# Patient Record
Sex: MALE | Race: OTHER | URBAN - METROPOLITAN AREA
[De-identification: names, ages, dates, MRNs, and addresses within clinical notes are randomized per-mention and may not be internally consistent; named-entity substitution may affect disease eponyms.]

---

## 2017-11-21 PROBLEM — Z00.00 ENCOUNTER FOR PREVENTIVE HEALTH EXAMINATION: Status: ACTIVE | Noted: 2017-11-21

## 2018-07-03 VITALS
RESPIRATION RATE: 16 BRPM | SYSTOLIC BLOOD PRESSURE: 142 MMHG | HEART RATE: 52 BPM | DIASTOLIC BLOOD PRESSURE: 70 MMHG | TEMPERATURE: 97 F

## 2018-07-03 NOTE — H&P ADULT - PMH
BPH (benign prostatic hypertrophy)    Coronary artery disease    Hyperlipidemia    Hypertension    Hypothyroidism BPH (benign prostatic hypertrophy)    Coronary artery disease    Hyperlipidemia    Hypertension    Hypothyroidism    Prediabetes

## 2018-07-03 NOTE — H&P ADULT - ASSESSMENT
67 y.o Male POOR HISTORIAN with PMHx of HTN, Hyperlipidemia, Hypothyroidism, Known CAD with prior PCI @ [facility] in 2007 (report archived), who presents for recommended LHC with possible intervention if clinically indicated secondary to CCS Anginal Class 3 Symptoms in the setting of an abnormal Stress Echo.      ASA: III and Mallampati: IIII     -Precath/Consented  -IVF Hydration NS @ 75cc/hr   -Loaded with ASA 325mg PO X 1 and Plavix 600mg PO X 1 67 y.o Male POOR HISTORIAN with PMHx of HTN, Hyperlipidemia, Hypothyroidism, Known CAD with prior PCI @ [facility] in 2007 (report archived), who presents for recommended LHC with possible intervention if clinically indicated secondary to CCS Anginal Class 3 Symptoms in the setting of an abnormal Stress Echo.      ASA: III and Mallampati: IIII     -Precath/Consented  -IVF Hydration NS @ 75cc/hr   -Loaded with ASA 325mg PO X 1 and Plavix 600mg PO X 1 67 y.o Male POOR HISTORIAN with PMHx of HTN, Hyperlipidemia, Hypothyroidism, Known CAD with prior PCI @ [facility] in 2007 (report archived), who presents for recommended LHC with possible intervention if clinically indicated secondary to CCS Anginal Class 3 Symptoms in the setting of an abnormal Stress Echo.      ASA: III and Mallampati: IIII     -Precath/Consented  -Pt found be bradycardic in the 40's upon arrival, asymptomatic currently.  But has been symptomatic  @ home with feeling lightheaded/dizziness ever since Metoprolol Tartrate 25mg PO BID was started recently as an outpt.  EKG today demonstrated Sinus Hermann @  47 bpm with 1st degree AVB. As d/w Dr. Barrera will d/c BB  for now unless pt undergoes PCI today consider lowering  BB dose.    -IVF Hydration NS @ 75cc/hr   -Loaded with ASA 325mg PO X 1 and Plavix 600mg PO X 1

## 2018-07-03 NOTE — H&P ADULT - HISTORY OF PRESENT ILLNESS
***PT TO BRING IN ALL MEDS    67 y.o Male POOR HISTORIAN with PMHx of HTN, Hyperlipidemia, Hypothyroidism, Known CAD with prior PCI @ [facility] in 2007 (report pending ), who presented t his cardiologist Dr. Barrera c/o new onset exertional CP and  dyspnea over the past few months.  Pt states he has been experiencing left sided Chest "pressure" 7/10 in severity, radiating to the left arm and jaw  with accompanying dyspnea on exertion when walking 1-2 blocks, resolving within minutes of rest.  Denies palpitations, dizziness, syncope, recent PND/orthopnea, or LE edema.  Pt was subsequently referred for Stress Echo on 06/02/18 which revealed inferior wall hypokinesis, trace MR/TR, LVEF of 55%.    Occasional PVCs and a rare couplet noted were during the study.        In light of pt's risk factors, Known CAD, above CCS Anginal Class 3 symptoms, and an abnormal Stress Echo, pt is now referred to [facility] for recommended Cardiac Cath with possible intervention if clinically indicated to  r/o progressive CAD. ***PT TO BRING IN ALL MEDS    67 y.o Male POOR HISTORIAN with PMHx of HTN, Hyperlipidemia, Hypothyroidism, Known CAD with prior PCI @ [facility] in 2007 (report archived), who presented t his cardiologist Dr. Barrera c/o new onset exertional CP and  dyspnea over the past few months.  Pt states he has been experiencing left sided Chest "pressure" 7/10 in severity, radiating to the left arm and jaw  with accompanying dyspnea on exertion when walking 1-2 blocks, resolving within minutes of rest.  Denies palpitations, dizziness, syncope, recent PND/orthopnea, or LE edema.  Pt was subsequently referred for Stress Echo on 06/02/18 which revealed inferior wall hypokinesis, trace MR/TR, LVEF of 55%.    Occasional PVCs and a rare couplet noted were during the study.        In light of pt's risk factors, Known CAD, above CCS Anginal Class 3 symptoms, and an abnormal Stress Echo, pt is now referred to [facility] for recommended Cardiac Cath with possible intervention if clinically indicated to  r/o progressive CAD. 67 y.o Male POOR HISTORIAN with PMHx of HTN, Hyperlipidemia, Hypothyroidism, Known CAD with prior PCI @ [facility] in 2007 (report archived), who presented t his cardiologist Dr. Barrera c/o new onset exertional CP and  dyspnea over the past few months.  Pt states he has been experiencing left sided Chest "pressure" 7/10 in severity, radiating to the left arm and jaw  with accompanying dyspnea on exertion when walking 1-2 blocks, resolving within minutes of rest.  Denies palpitations, dizziness, syncope, recent PND/orthopnea, or LE edema.  Pt was subsequently referred for Stress Echo on 06/02/18 which revealed inferior wall hypokinesis, trace MR/TR, LVEF of 55%.    Occasional PVCs and a rare couplet noted were during the study.        In light of pt's risk factors, Known CAD, above CCS Anginal Class 3 symptoms, and an abnormal Stress Echo, pt is now referred to [facility] for recommended Cardiac Cath with possible intervention if clinically indicated to  r/o progressive CAD. 67 y.o Male POOR HISTORIAN with PMHx of HTN, Hyperlipidemia, prediabetes, Hypothyroidism, Known CAD with prior PCI @ [facility] in 2007 (report archived), who presented t his cardiologist Dr. Barrera c/o new onset exertional CP and  dyspnea over the past few months.  Pt states he has been experiencing left sided Chest "pressure" 7/10 in severity, radiating to the left arm and jaw  with accompanying dyspnea on exertion when walking 1-2 blocks, resolving within minutes of rest.  Denies palpitations, dizziness, syncope, recent PND/orthopnea, or LE edema.  Pt was subsequently referred for Stress Echo on 06/02/18 which revealed inferior wall hypokinesis, trace MR/TR, LVEF of 55%.    Occasional PVCs and a rare couplet noted were during the study.        In light of pt's risk factors, Known CAD, above CCS Anginal Class 3 symptoms, and an abnormal Stress Echo, pt is now referred to [facility] for recommended Cardiac Cath with possible intervention if clinically indicated to  r/o progressive CAD.

## 2018-07-06 ENCOUNTER — INPATIENT (INPATIENT)
Facility: HOSPITAL | Age: 68
LOS: 0 days | Discharge: HOME | DRG: 247 | End: 2018-07-07
Attending: INTERNAL MEDICINE | Admitting: INTERNAL MEDICINE
Payer: MEDICARE

## 2018-07-06 DIAGNOSIS — Z90.79 ACQUIRED ABSENCE OF OTHER GENITAL ORGAN(S): Chronic | ICD-10-CM

## 2018-07-06 DIAGNOSIS — Z98.890 OTHER SPECIFIED POSTPROCEDURAL STATES: Chronic | ICD-10-CM

## 2018-07-06 LAB
ALBUMIN SERPL ELPH-MCNC: 4.9 G/DL — SIGNIFICANT CHANGE UP (ref 3.3–5)
ALP SERPL-CCNC: 85 U/L — SIGNIFICANT CHANGE UP (ref 40–120)
ALT FLD-CCNC: 20 U/L — SIGNIFICANT CHANGE UP (ref 10–45)
ANION GAP SERPL CALC-SCNC: 16 MMOL/L — SIGNIFICANT CHANGE UP (ref 5–17)
APTT BLD: 32.2 SEC — SIGNIFICANT CHANGE UP (ref 27.5–37.4)
AST SERPL-CCNC: 23 U/L — SIGNIFICANT CHANGE UP (ref 10–40)
BASOPHILS NFR BLD AUTO: 0.6 % — SIGNIFICANT CHANGE UP (ref 0–2)
BILIRUB SERPL-MCNC: 0.8 MG/DL — SIGNIFICANT CHANGE UP (ref 0.2–1.2)
BUN SERPL-MCNC: 29 MG/DL — HIGH (ref 7–23)
CALCIUM SERPL-MCNC: 9.5 MG/DL — SIGNIFICANT CHANGE UP (ref 8.4–10.5)
CHLORIDE SERPL-SCNC: 102 MMOL/L — SIGNIFICANT CHANGE UP (ref 96–108)
CHOLEST SERPL-MCNC: 178 MG/DL — SIGNIFICANT CHANGE UP (ref 10–199)
CK MB CFR SERPL CALC: 2.8 NG/ML — SIGNIFICANT CHANGE UP (ref 0–6.7)
CK SERPL-CCNC: 189 U/L — SIGNIFICANT CHANGE UP (ref 30–200)
CO2 SERPL-SCNC: 23 MMOL/L — SIGNIFICANT CHANGE UP (ref 22–31)
CREAT SERPL-MCNC: 1.16 MG/DL — SIGNIFICANT CHANGE UP (ref 0.5–1.3)
CRP SERPL-MCNC: 0.1 MG/DL — SIGNIFICANT CHANGE UP (ref 0–0.4)
EOSINOPHIL NFR BLD AUTO: 5 % — SIGNIFICANT CHANGE UP (ref 0–6)
GLUCOSE SERPL-MCNC: 103 MG/DL — HIGH (ref 70–99)
HCT VFR BLD CALC: 38.6 % — LOW (ref 39–50)
HDLC SERPL-MCNC: 64 MG/DL — SIGNIFICANT CHANGE UP (ref 40–125)
HGB BLD-MCNC: 12.8 G/DL — LOW (ref 13–17)
INR BLD: 0.96 — SIGNIFICANT CHANGE UP (ref 0.88–1.16)
LIPID PNL WITH DIRECT LDL SERPL: 96 MG/DL — SIGNIFICANT CHANGE UP
LYMPHOCYTES # BLD AUTO: 32.8 % — SIGNIFICANT CHANGE UP (ref 13–44)
MCHC RBC-ENTMCNC: 28.8 PG — SIGNIFICANT CHANGE UP (ref 27–34)
MCHC RBC-ENTMCNC: 33.2 G/DL — SIGNIFICANT CHANGE UP (ref 32–36)
MCV RBC AUTO: 86.7 FL — SIGNIFICANT CHANGE UP (ref 80–100)
MONOCYTES NFR BLD AUTO: 7.4 % — SIGNIFICANT CHANGE UP (ref 2–14)
NEUTROPHILS NFR BLD AUTO: 54.2 % — SIGNIFICANT CHANGE UP (ref 43–77)
PLATELET # BLD AUTO: 258 K/UL — SIGNIFICANT CHANGE UP (ref 150–400)
POTASSIUM SERPL-MCNC: 4.4 MMOL/L — SIGNIFICANT CHANGE UP (ref 3.5–5.3)
POTASSIUM SERPL-SCNC: 4.4 MMOL/L — SIGNIFICANT CHANGE UP (ref 3.5–5.3)
PROT SERPL-MCNC: 8.4 G/DL — HIGH (ref 6–8.3)
PROTHROM AB SERPL-ACNC: 10.7 SEC — SIGNIFICANT CHANGE UP (ref 9.8–12.7)
RBC # BLD: 4.45 M/UL — SIGNIFICANT CHANGE UP (ref 4.2–5.8)
RBC # FLD: 14.6 % — SIGNIFICANT CHANGE UP (ref 10.3–16.9)
SODIUM SERPL-SCNC: 141 MMOL/L — SIGNIFICANT CHANGE UP (ref 135–145)
TOTAL CHOLESTEROL/HDL RATIO MEASUREMENT: 2.8 RATIO — LOW (ref 3.4–9.6)
TRIGL SERPL-MCNC: 89 MG/DL — SIGNIFICANT CHANGE UP (ref 10–149)
WBC # BLD: 9 K/UL — SIGNIFICANT CHANGE UP (ref 3.8–10.5)
WBC # FLD AUTO: 9 K/UL — SIGNIFICANT CHANGE UP (ref 3.8–10.5)

## 2018-07-06 PROCEDURE — 92933 PRQ TRLML C ATHRC ST ANGIOP1: CPT | Mod: RC

## 2018-07-06 PROCEDURE — 93458 L HRT ARTERY/VENTRICLE ANGIO: CPT | Mod: 26,XU

## 2018-07-06 PROCEDURE — 92978 ENDOLUMINL IVUS OCT C 1ST: CPT | Mod: 26,RC

## 2018-07-06 RX ORDER — AMLODIPINE BESYLATE 2.5 MG/1
10 TABLET ORAL DAILY
Qty: 0 | Refills: 0 | Status: DISCONTINUED | OUTPATIENT
Start: 2018-07-06 | End: 2018-07-07

## 2018-07-06 RX ORDER — SODIUM CHLORIDE 9 MG/ML
500 INJECTION INTRAMUSCULAR; INTRAVENOUS; SUBCUTANEOUS
Qty: 0 | Refills: 0 | Status: DISCONTINUED | OUTPATIENT
Start: 2018-07-06 | End: 2018-07-06

## 2018-07-06 RX ORDER — FUROSEMIDE 40 MG
1 TABLET ORAL
Qty: 0 | Refills: 0 | COMMUNITY

## 2018-07-06 RX ORDER — CLOPIDOGREL BISULFATE 75 MG/1
75 TABLET, FILM COATED ORAL DAILY
Qty: 0 | Refills: 0 | Status: DISCONTINUED | OUTPATIENT
Start: 2018-07-07 | End: 2018-07-07

## 2018-07-06 RX ORDER — FUROSEMIDE 40 MG
20 TABLET ORAL DAILY
Qty: 0 | Refills: 0 | Status: DISCONTINUED | OUTPATIENT
Start: 2018-07-06 | End: 2018-07-07

## 2018-07-06 RX ORDER — CLOPIDOGREL BISULFATE 75 MG/1
600 TABLET, FILM COATED ORAL ONCE
Qty: 0 | Refills: 0 | Status: COMPLETED | OUTPATIENT
Start: 2018-07-06 | End: 2018-07-06

## 2018-07-06 RX ORDER — SODIUM CHLORIDE 9 MG/ML
500 INJECTION INTRAMUSCULAR; INTRAVENOUS; SUBCUTANEOUS
Qty: 0 | Refills: 0 | Status: DISCONTINUED | OUTPATIENT
Start: 2018-07-06 | End: 2018-07-07

## 2018-07-06 RX ORDER — ASPIRIN/CALCIUM CARB/MAGNESIUM 324 MG
325 TABLET ORAL ONCE
Qty: 0 | Refills: 0 | Status: COMPLETED | OUTPATIENT
Start: 2018-07-06 | End: 2018-07-06

## 2018-07-06 RX ORDER — AMLODIPINE BESYLATE 2.5 MG/1
1 TABLET ORAL
Qty: 0 | Refills: 0 | COMMUNITY

## 2018-07-06 RX ORDER — ATORVASTATIN CALCIUM 80 MG/1
1 TABLET, FILM COATED ORAL
Qty: 0 | Refills: 0 | COMMUNITY

## 2018-07-06 RX ORDER — LEVOTHYROXINE SODIUM 125 MCG
112 TABLET ORAL DAILY
Qty: 0 | Refills: 0 | Status: DISCONTINUED | OUTPATIENT
Start: 2018-07-06 | End: 2018-07-07

## 2018-07-06 RX ORDER — CHLORHEXIDINE GLUCONATE 213 G/1000ML
1 SOLUTION TOPICAL ONCE
Qty: 0 | Refills: 0 | Status: COMPLETED | OUTPATIENT
Start: 2018-07-06 | End: 2018-07-06

## 2018-07-06 RX ORDER — ATORVASTATIN CALCIUM 80 MG/1
20 TABLET, FILM COATED ORAL AT BEDTIME
Qty: 0 | Refills: 0 | Status: DISCONTINUED | OUTPATIENT
Start: 2018-07-06 | End: 2018-07-07

## 2018-07-06 RX ORDER — ASPIRIN/CALCIUM CARB/MAGNESIUM 324 MG
81 TABLET ORAL DAILY
Qty: 0 | Refills: 0 | Status: DISCONTINUED | OUTPATIENT
Start: 2018-07-07 | End: 2018-07-07

## 2018-07-06 RX ORDER — METOPROLOL TARTRATE 50 MG
25 TABLET ORAL
Qty: 0 | Refills: 0 | Status: DISCONTINUED | OUTPATIENT
Start: 2018-07-06 | End: 2018-07-07

## 2018-07-06 RX ORDER — LEVOTHYROXINE SODIUM 125 MCG
1 TABLET ORAL
Qty: 0 | Refills: 0 | COMMUNITY

## 2018-07-06 RX ADMIN — ATORVASTATIN CALCIUM 20 MILLIGRAM(S): 80 TABLET, FILM COATED ORAL at 21:39

## 2018-07-06 RX ADMIN — CLOPIDOGREL BISULFATE 600 MILLIGRAM(S): 75 TABLET, FILM COATED ORAL at 15:17

## 2018-07-06 RX ADMIN — Medication 325 MILLIGRAM(S): at 15:17

## 2018-07-06 RX ADMIN — SODIUM CHLORIDE 75 MILLILITER(S): 9 INJECTION INTRAMUSCULAR; INTRAVENOUS; SUBCUTANEOUS at 15:18

## 2018-07-06 RX ADMIN — SODIUM CHLORIDE 75 MILLILITER(S): 9 INJECTION INTRAMUSCULAR; INTRAVENOUS; SUBCUTANEOUS at 18:22

## 2018-07-06 NOTE — PATIENT PROFILE ADULT. - TEACHING/LEARNING LEARNING PREFERENCES
skill demonstration/pictorial/written material/audio/computer/internet/individual instruction/video/group instruction/verbal instruction

## 2018-07-07 ENCOUNTER — TRANSCRIPTION ENCOUNTER (OUTPATIENT)
Age: 68
End: 2018-07-07

## 2018-07-07 VITALS — TEMPERATURE: 97 F

## 2018-07-07 LAB
ALBUMIN SERPL ELPH-MCNC: 4 G/DL — SIGNIFICANT CHANGE UP (ref 3.3–5)
ALP SERPL-CCNC: 71 U/L — SIGNIFICANT CHANGE UP (ref 40–120)
ALT FLD-CCNC: 15 U/L — SIGNIFICANT CHANGE UP (ref 10–45)
ANION GAP SERPL CALC-SCNC: 14 MMOL/L — SIGNIFICANT CHANGE UP (ref 5–17)
AST SERPL-CCNC: 20 U/L — SIGNIFICANT CHANGE UP (ref 10–40)
BILIRUB SERPL-MCNC: 0.7 MG/DL — SIGNIFICANT CHANGE UP (ref 0.2–1.2)
BUN SERPL-MCNC: 24 MG/DL — HIGH (ref 7–23)
CALCIUM SERPL-MCNC: 8.6 MG/DL — SIGNIFICANT CHANGE UP (ref 8.4–10.5)
CHLORIDE SERPL-SCNC: 105 MMOL/L — SIGNIFICANT CHANGE UP (ref 96–108)
CO2 SERPL-SCNC: 21 MMOL/L — LOW (ref 22–31)
CREAT SERPL-MCNC: 1.11 MG/DL — SIGNIFICANT CHANGE UP (ref 0.5–1.3)
GLUCOSE SERPL-MCNC: 93 MG/DL — SIGNIFICANT CHANGE UP (ref 70–99)
HCT VFR BLD CALC: 35.1 % — LOW (ref 39–50)
HGB BLD-MCNC: 11.8 G/DL — LOW (ref 13–17)
MAGNESIUM SERPL-MCNC: 2 MG/DL — SIGNIFICANT CHANGE UP (ref 1.6–2.6)
MCHC RBC-ENTMCNC: 29.4 PG — SIGNIFICANT CHANGE UP (ref 27–34)
MCHC RBC-ENTMCNC: 33.6 G/DL — SIGNIFICANT CHANGE UP (ref 32–36)
MCV RBC AUTO: 87.5 FL — SIGNIFICANT CHANGE UP (ref 80–100)
PLATELET # BLD AUTO: 253 K/UL — SIGNIFICANT CHANGE UP (ref 150–400)
POTASSIUM SERPL-MCNC: 4.1 MMOL/L — SIGNIFICANT CHANGE UP (ref 3.5–5.3)
POTASSIUM SERPL-SCNC: 4.1 MMOL/L — SIGNIFICANT CHANGE UP (ref 3.5–5.3)
PROT SERPL-MCNC: 6.9 G/DL — SIGNIFICANT CHANGE UP (ref 6–8.3)
RBC # BLD: 4.01 M/UL — LOW (ref 4.2–5.8)
RBC # FLD: 14.5 % — SIGNIFICANT CHANGE UP (ref 10.3–16.9)
SODIUM SERPL-SCNC: 140 MMOL/L — SIGNIFICANT CHANGE UP (ref 135–145)
WBC # BLD: 8.5 K/UL — SIGNIFICANT CHANGE UP (ref 3.8–10.5)
WBC # FLD AUTO: 8.5 K/UL — SIGNIFICANT CHANGE UP (ref 3.8–10.5)

## 2018-07-07 RX ORDER — ASPIRIN/CALCIUM CARB/MAGNESIUM 324 MG
1 TABLET ORAL
Qty: 0 | Refills: 0 | COMMUNITY

## 2018-07-07 RX ORDER — METOPROLOL TARTRATE 50 MG
1 TABLET ORAL
Qty: 0 | Refills: 0 | COMMUNITY

## 2018-07-07 RX ORDER — CLOPIDOGREL BISULFATE 75 MG/1
1 TABLET, FILM COATED ORAL
Qty: 30 | Refills: 11 | OUTPATIENT
Start: 2018-07-07 | End: 2019-07-01

## 2018-07-07 RX ORDER — ASPIRIN/CALCIUM CARB/MAGNESIUM 324 MG
1 TABLET ORAL
Qty: 30 | Refills: 11 | OUTPATIENT
Start: 2018-07-07 | End: 2019-07-01

## 2018-07-07 RX ADMIN — Medication 20 MILLIGRAM(S): at 06:11

## 2018-07-07 RX ADMIN — AMLODIPINE BESYLATE 10 MILLIGRAM(S): 2.5 TABLET ORAL at 11:17

## 2018-07-07 RX ADMIN — CLOPIDOGREL BISULFATE 75 MILLIGRAM(S): 75 TABLET, FILM COATED ORAL at 11:17

## 2018-07-07 RX ADMIN — Medication 81 MILLIGRAM(S): at 11:17

## 2018-07-07 RX ADMIN — Medication 112 MICROGRAM(S): at 06:11

## 2018-07-07 NOTE — DISCHARGE NOTE ADULT - PLAN OF CARE
Please continue aspirin 81 mg oral daily and plavix 75 mg oral daily. You underwent a cardiac catheterization and received 3 stents to the right coronary artery. The procedure was done through the right wrist. You do not need to keep this area covered and you may shower  Please avoid any heavy lifting  (no more than 3 to 5 lbs) or strenuous activity for five days  If you develop any swelling, bleeding, hardening of the skin (hematoma formation), acute pain, numbness/tingling  in your arm please contact your doctor immediately or call our 24/7 line: [phone]    NEVER MISS A DOSE OF ASPIRIN OR PLAVIX; IF YOU DO, YOU ARE AT RISK OF YOUR STENT CLOSING AND HAVING A HEART ATTACK. DO NOT STOP THESE TWO MEDICATIONS UNLESS INSTRUCTED TO DO SO BY YOUR CARDIOLOGIST    Prescriptions were sent to your pharmacy. You underwent a cardiac catheterization and received 3 stents to the right coronary artery. The procedure was done through the right wrist. You do not need to keep this area covered and you may shower  Please avoid any heavy lifting  (no more than 3 to 5 lbs) or strenuous activity for five days  If you develop any swelling, bleeding, hardening of the skin (hematoma formation), acute pain, numbness/tingling  in your arm please contact your doctor immediately or call our 24/7 line: [phone]    NEVER MISS A DOSE OF ASPIRIN OR PLAVIX; IF YOU DO, YOU ARE AT RISK OF YOUR STENT CLOSING AND HAVING A HEART ATTACK. DO NOT STOP THESE TWO MEDICATIONS UNLESS INSTRUCTED TO DO SO BY YOUR CARDIOLOGIST    Prescriptions were sent to your pharmacy.    Please follow up with your cardiologist Dr. Barrera on 7/18/18 Please continue amlodipine 10 mg oral daily, enalapril 20 mg oral daily, and lasix 20 mg oral daily. Please stop taking metoprolol succinate 25 mg oral twice daily.     Please follow up with Dr. Barrera on 7/18/18 Please continue taking atorvastatin 20 mg oral daily

## 2018-07-07 NOTE — DISCHARGE NOTE ADULT - MEDICATION SUMMARY - MEDICATIONS TO TAKE
I will START or STAY ON the medications listed below when I get home from the hospital:    Ecotrin Adult Low Strength 81 mg oral delayed release tablet  -- 1 tab(s) by mouth once a day  -- Indication: For Coronary artery disease    enalapril 20 mg oral tablet  -- 1 tab(s) by mouth once a day  -- Indication: For Coronary artery disease, hypertension     atorvastatin 20 mg oral tablet  -- 1 tab(s) by mouth once a day  -- Indication: For Hyperlipidemia    clopidogrel 75 mg oral tablet  -- 1 tab(s) by mouth once a day  -- Indication: For Coronary artery disease, keeps your stent open     Norvasc 10 mg oral tablet  -- 1 tab(s) by mouth once a day  -- Indication: For Hypertension    Lasix 20 mg oral tablet  -- 1 tab(s) by mouth once a day  -- Indication: For Hypertension    levothyroxine 112 mcg (0.112 mg) oral capsule  -- 1 cap(s) by mouth once a day  -- Indication: For thyroid

## 2018-07-07 NOTE — DISCHARGE NOTE ADULT - INSTRUCTIONS
You do not need to keep this area covered and you may shower  Please avoid any heavy lifting  (no more than 3 to 5 lbs) or strenuous activity for five days  If you develop any swelling, bleeding, hardening of the skin (hematoma formation), acute pain, numbness/tingling  in your arm  please contact your doctor immediately or call our 24/7 line: [phone]

## 2018-07-07 NOTE — DISCHARGE NOTE ADULT - CARE PROVIDER_API CALL
Dewayne Barrera), Cardiovascular Disease; Interventional Cardiology; Nuclear Cardiology  [address] [address]  Phone: [phone]  Fax: [phone]    Dewayne Barrera  [address] [place] [address]  Phone: [phone]  Fax: (       -

## 2018-07-07 NOTE — DISCHARGE NOTE ADULT - CARE PLAN
Principal Discharge DX:	Coronary artery disease  Secondary Diagnosis:	Hypertension  Secondary Diagnosis:	Hyperlipidemia Principal Discharge DX:	Coronary artery disease  Goal:	Please continue aspirin 81 mg oral daily and plavix 75 mg oral daily.  Assessment and plan of treatment:	You underwent a cardiac catheterization and received 3 stents to the right coronary artery. The procedure was done through the right wrist. You do not need to keep this area covered and you may shower  Please avoid any heavy lifting  (no more than 3 to 5 lbs) or strenuous activity for five days  If you develop any swelling, bleeding, hardening of the skin (hematoma formation), acute pain, numbness/tingling  in your arm please contact your doctor immediately or call our 24/7 line: [phone]    NEVER MISS A DOSE OF ASPIRIN OR PLAVIX; IF YOU DO, YOU ARE AT RISK OF YOUR STENT CLOSING AND HAVING A HEART ATTACK. DO NOT STOP THESE TWO MEDICATIONS UNLESS INSTRUCTED TO DO SO BY YOUR CARDIOLOGIST    Prescriptions were sent to your pharmacy.  Secondary Diagnosis:	Hypertension  Secondary Diagnosis:	Hyperlipidemia Principal Discharge DX:	Coronary artery disease  Goal:	Please continue aspirin 81 mg oral daily and plavix 75 mg oral daily.  Assessment and plan of treatment:	You underwent a cardiac catheterization and received 3 stents to the right coronary artery. The procedure was done through the right wrist. You do not need to keep this area covered and you may shower  Please avoid any heavy lifting  (no more than 3 to 5 lbs) or strenuous activity for five days  If you develop any swelling, bleeding, hardening of the skin (hematoma formation), acute pain, numbness/tingling  in your arm please contact your doctor immediately or call our 24/7 line: [phone]    NEVER MISS A DOSE OF ASPIRIN OR PLAVIX; IF YOU DO, YOU ARE AT RISK OF YOUR STENT CLOSING AND HAVING A HEART ATTACK. DO NOT STOP THESE TWO MEDICATIONS UNLESS INSTRUCTED TO DO SO BY YOUR CARDIOLOGIST    Prescriptions were sent to your pharmacy.    Please follow up with your cardiologist Dr. Barrera on 7/18/18  Secondary Diagnosis:	Hypertension  Goal:	Please continue amlodipine 10 mg oral daily, enalapril 20 mg oral daily, and lasix 20 mg oral daily.  Assessment and plan of treatment:	Please stop taking metoprolol succinate 25 mg oral twice daily.     Please follow up with Dr. Barrera on 7/18/18  Secondary Diagnosis:	Hyperlipidemia  Goal:	Please continue taking atorvastatin 20 mg oral daily

## 2018-07-07 NOTE — DISCHARGE NOTE ADULT - PROVIDER TOKENS
TOKEN:'[id number]',FREE:[LAST:[Bruce],FIRST:[Dewayne],PHONE:[phone]],FAX:[(   )    -],ADDRESS:[[address] [place] [address]]]

## 2018-07-07 NOTE — PROGRESS NOTE ADULT - SUBJECTIVE AND OBJECTIVE BOX
INTERVENTIONAL CARDIOLOGY FOLLOW UP NOTE    -Patient seen and examined this am  -No events overnight  -No complaints this am    VASCULAR ACCESS EXAM:     RADIAL:   2+ right radial pulse   Normal capillary refill. No evidence of motor or sensory deficits of digits, hand, wrist or forearm.   -Access site clean, non-tender, without ecchymosis or hematoma.    A/P  68 yo M with PMH HTN, HLD, Hypothyroidism now s/p PCI of RCA with BOOKER via radial approach with no evidence of vascular complications post procedure.     -continue with current medications including dual antiplatelet therapy (ASA + Plavix)  -discharge instructions as per protocol   -outpatient follow up with primary cardiologist    Nicolas Gould MD

## 2018-07-07 NOTE — DISCHARGE NOTE ADULT - HOSPITAL COURSE
67 y.o Male POOR HISTORIAN with PMHx of HTN, Hyperlipidemia, prediabetes, Hypothyroidism, Known CAD with prior PCI @ [facility] in 2007 (report archived), who presented t his cardiologist Dr. Barrera c/o new onset exertional CP and  dyspnea over the past few months.  Pt states he has been experiencing left sided Chest "pressure" 7/10 in severity, radiating to the left arm and jaw  with accompanying dyspnea on exertion when walking 1-2 blocks, resolving within minutes of rest.  Denies palpitations, dizziness, syncope, recent PND/orthopnea, or LE edema.  Pt was subsequently referred for Stress Echo on 06/02/18 which revealed inferior wall hypokinesis, trace MR/TR, LVEF of 55%.    Occasional PVCs and a rare couplet noted were during the study.  In light of pt's risk factors, Known CAD, above CCS Anginal Class 3 symptoms, and an abnormal Stress Echo, pt is now referred to [facility] for recommended Cardiac Cath with possible intervention if clinically indicated to  r/o progressive CAD. Pt is now s/p cardiac catheterization on 7/6/18 with BOOKER x3 p/m/d RCA. Patent Diag stent, dLM 30%. Mild luminal irregularities in LAD/LCx. EF 55%, R radial access. Pt admitted to  uris telemetry overnight for observation. Pt without events overnight, labs checked. R radial site stable without hematoma, bleeding, distal pulse +2. Pt will be discharged with aspirin 81 mg daily and plavix 75 mg daily. Prescriptions sent to patient's pharmacy. Pt will continue with amlodipine 10 mg daily, enalapril 20 mg daily, furosemide 20 mg daily, metoprolol 25 mg BID, atorvastatin 20 mg daily. Pt deemed stable for discharge per Dr. Shelby and will follow up with his cardiologist Dr. Barrera on 7/18/18. 67 y.o Male POOR HISTORIAN with PMHx of HTN, Hyperlipidemia, prediabetes, Hypothyroidism, Known CAD with prior PCI @ [facility] in 2007 (report archived), who presented t his cardiologist Dr. Barrera c/o new onset exertional CP and  dyspnea over the past few months.  Pt states he has been experiencing left sided Chest "pressure" 7/10 in severity, radiating to the left arm and jaw  with accompanying dyspnea on exertion when walking 1-2 blocks, resolving within minutes of rest.  Denies palpitations, dizziness, syncope, recent PND/orthopnea, or LE edema.  Pt was subsequently referred for Stress Echo on 06/02/18 which revealed inferior wall hypokinesis, trace MR/TR, LVEF of 55%.    Occasional PVCs and a rare couplet noted were during the study.  In light of pt's risk factors, Known CAD, above CCS Anginal Class 3 symptoms, and an abnormal Stress Echo, pt is now referred to [facility] for recommended Cardiac Cath with possible intervention if clinically indicated to  r/o progressive CAD. Pt is now s/p cardiac catheterization on 7/6/18 with BOOKER x3 p/m/d RCA. Patent Diag stent, dLM 30%. Mild luminal irregularities in LAD/LCx. EF 55%, R radial access. Pt admitted to 5 uris telemetry overnight for observation. Pt without events overnight, labs checked. R radial site stable without hematoma, bleeding, distal pulse +2. Pt will be discharged with aspirin 81 mg daily and plavix 75 mg daily. Prescriptions sent to patient's pharmacy. Pt will continue with amlodipine 10 mg daily, enalapril 20 mg daily, furosemide 20 mg daily, atorvastatin 20 mg daily. As discussed with Dr. Barrera who is the patient's outpt provider, 2/2 symptomatic bradycardia, the metoprolol 25 mg PO BID will be discontinued. Pt deemed stable for discharge per Dr. Shelby and will follow up with his cardiologist Dr. Barrera on 7/18/18. 67 y.o Male POOR HISTORIAN with PMHx of HTN, Hyperlipidemia, prediabetes, Hypothyroidism, Known CAD with prior PCI @ [facility] in 2007 (report archived), who presented t his cardiologist Dr. Barrera c/o new onset exertional CP and  dyspnea over the past few months.  Pt states he has been experiencing left sided Chest "pressure" 7/10 in severity, radiating to the left arm and jaw  with accompanying dyspnea on exertion when walking 1-2 blocks, resolving within minutes of rest.  Denies palpitations, dizziness, syncope, recent PND/orthopnea, or LE edema.  Pt was subsequently referred for Stress Echo on 06/02/18 which revealed inferior wall hypokinesis, trace MR/TR, LVEF of 55%.   Occasional PVCs and a rare couplet noted were during the study.  In light of pt's risk factors, Known CAD, above CCS Anginal Class 3 symptoms, and an abnormal Stress Echo, pt is now referred to [facility] for recommended Cardiac Cath with possible intervention if clinically indicated to  r/o progressive CAD. Pt is now s/p cardiac catheterization on 7/6/18 with BOOKER x3 p/m/d RCA. Patent Diag stent, dLM 30%. Mild luminal irregularities in LAD/LCx. EF 55%, R radial access. Pt admitted to  uris telemetry overnight for observation. Pt with asymptomatic bradycardia, metoprolol being held as instructed per Dr. Barrera, labs checked. R radial site stable without hematoma, bleeding, distal pulse +2. Pt will be discharged with aspirin 81 mg daily and plavix 75 mg daily. Prescriptions sent to patient's pharmacy. Pt will continue with amlodipine 10 mg daily, enalapril 20 mg daily, furosemide 20 mg daily, atorvastatin 20 mg daily. As discussed with Dr. Barrera who is the patient's outpt provider, 2/2 symptomatic bradycardia, the metoprolol 25 mg PO BID will be discontinued. Pt deemed stable for discharge per Dr. Shelby and will follow up with his cardiologist Dr. Barrera on 7/18/18.

## 2018-07-07 NOTE — PROGRESS NOTE ADULT - SUBJECTIVE AND OBJECTIVE BOX
Pt is s/p CAD   Cardiac  Cath with BOOKER to RCA x 2 yesterday   LM normal   LAD luminal irregularities   RCA 99 % lesion   distal RCA 80 % lesion   EF 60%    PAST MEDICAL & SURGICAL HISTORY:  Prediabetes  Coronary artery disease  Hypothyroidism  Hyperlipidemia  Hypertension  BPH (benign prostatic hypertrophy)  History of percutaneous coronary intervention  S/P TURP    MEDICATIONS  (STANDING):  amLODIPine   Tablet 10 milliGRAM(s) Oral daily  aspirin enteric coated 81 milliGRAM(s) Oral daily  atorvastatin 20 milliGRAM(s) Oral at bedtime  clopidogrel Tablet 75 milliGRAM(s) Oral daily  enalapril 20 milliGRAM(s) Oral daily  furosemide    Tablet 20 milliGRAM(s) Oral daily  levothyroxine 112 MICROGram(s) Oral daily  metoprolol tartrate 25 milliGRAM(s) Oral two times a day  sodium chloride 0.9%. 500 milliLiter(s) (75 mL/Hr) IV Continuous <Continuous>    MEDICATIONS  (PRN):    ICU Vital Signs Last 24 Hrs  T(C): 36.1 (07 Jul 2018 09:08), Max: 36.4 (07 Jul 2018 01:43)  T(F): 97 (07 Jul 2018 09:08), Max: 97.6 (07 Jul 2018 01:43)  HR: 52 (07 Jul 2018 09:16) (45 - 59)  BP: 135/72 (07 Jul 2018 09:16) (118/64 - 155/78)  BP(mean): --  ABP: --  ABP(mean): --  RR: 19 (07 Jul 2018 09:16) (15 - 19)  SpO2: 96% (07 Jul 2018 09:16) (96% - 98%)      Lungs clear  Cv s1 s2  Abd soft  Ext stable                          11.8   8.5   )-----------( 253      ( 07 Jul 2018 07:43 )             35.1   07-07    140  |  105  |  24<H>  ----------------------------<  93  4.1   |  21<L>  |  1.11    Ca    8.6      07 Jul 2018 07:43  Mg     2.0     07-07    TPro  6.9  /  Alb  4.0  /  TBili  0.7  /  DBili  x   /  AST  20  /  ALT  15  /  AlkPhos  71  07-07  CARDIAC MARKERS ( 06 Jul 2018 14:12 )  x     / x     / 189 U/L / x     / 2.8 ng/mL    EKG NSB  no acute change

## 2018-07-07 NOTE — DISCHARGE NOTE ADULT - MEDICATION SUMMARY - MEDICATIONS TO STOP TAKING
I will STOP taking the medications listed below when I get home from the hospital:    Metoprolol Tartrate 25 mg oral tablet  -- 1 tab(s) by mouth 2 times a day

## 2018-07-07 NOTE — DISCHARGE NOTE ADULT - PATIENT PORTAL LINK FT
You can access the [facility], offered by [facility], by registering with the following website: [email or web address]

## 2018-07-11 DIAGNOSIS — Z88.2 ALLERGY STATUS TO SULFONAMIDES: ICD-10-CM

## 2018-07-11 DIAGNOSIS — N40.0 BENIGN PROSTATIC HYPERPLASIA WITHOUT LOWER URINARY TRACT SYMPTOMS: ICD-10-CM

## 2018-07-11 DIAGNOSIS — R73.03 PREDIABETES: ICD-10-CM

## 2018-07-11 DIAGNOSIS — I25.110 ATHEROSCLEROTIC HEART DISEASE OF NATIVE CORONARY ARTERY WITH UNSTABLE ANGINA PECTORIS: ICD-10-CM

## 2018-07-11 DIAGNOSIS — I10 ESSENTIAL (PRIMARY) HYPERTENSION: ICD-10-CM

## 2018-07-11 DIAGNOSIS — Z87.891 PERSONAL HISTORY OF NICOTINE DEPENDENCE: ICD-10-CM

## 2018-07-11 DIAGNOSIS — E78.5 HYPERLIPIDEMIA, UNSPECIFIED: ICD-10-CM

## 2018-07-11 DIAGNOSIS — E03.9 HYPOTHYROIDISM, UNSPECIFIED: ICD-10-CM

## 2018-07-11 DIAGNOSIS — I25.10 ATHEROSCLEROTIC HEART DISEASE OF NATIVE CORONARY ARTERY WITHOUT ANGINA PECTORIS: ICD-10-CM

## 2018-07-11 DIAGNOSIS — Z95.5 PRESENCE OF CORONARY ANGIOPLASTY IMPLANT AND GRAFT: ICD-10-CM

## 2018-07-17 PROCEDURE — 85027 COMPLETE CBC AUTOMATED: CPT

## 2018-07-17 PROCEDURE — 85610 PROTHROMBIN TIME: CPT

## 2018-07-17 PROCEDURE — C1725: CPT

## 2018-07-17 PROCEDURE — 82553 CREATINE MB FRACTION: CPT

## 2018-07-17 PROCEDURE — 85025 COMPLETE CBC W/AUTO DIFF WBC: CPT

## 2018-07-17 PROCEDURE — C1887: CPT

## 2018-07-17 PROCEDURE — C1753: CPT

## 2018-07-17 PROCEDURE — C1874: CPT

## 2018-07-17 PROCEDURE — C1769: CPT

## 2018-07-17 PROCEDURE — 83735 ASSAY OF MAGNESIUM: CPT

## 2018-07-17 PROCEDURE — 80061 LIPID PANEL: CPT

## 2018-07-17 PROCEDURE — 36415 COLL VENOUS BLD VENIPUNCTURE: CPT

## 2018-07-17 PROCEDURE — C1724: CPT

## 2018-07-17 PROCEDURE — 82550 ASSAY OF CK (CPK): CPT

## 2018-07-17 PROCEDURE — 85730 THROMBOPLASTIN TIME PARTIAL: CPT

## 2018-07-17 PROCEDURE — 80053 COMPREHEN METABOLIC PANEL: CPT

## 2018-07-17 PROCEDURE — 86140 C-REACTIVE PROTEIN: CPT

## 2023-08-24 NOTE — PATIENT PROFILE ADULT. - ALCOHOL USE HISTORY SINGLE SELECT
never Dupixent Pregnancy And Lactation Text: This medication likely crosses the placenta but the risk for the fetus is uncertain. This medication is excreted in breast milk.